# Patient Record
Sex: FEMALE | Race: WHITE | URBAN - NONMETROPOLITAN AREA
[De-identification: names, ages, dates, MRNs, and addresses within clinical notes are randomized per-mention and may not be internally consistent; named-entity substitution may affect disease eponyms.]

---

## 2017-01-23 ENCOUNTER — TELEPHONE (OUTPATIENT)
Dept: INTERNAL MEDICINE | Facility: OTHER | Age: 52
End: 2017-01-23

## 2017-01-23 NOTE — TELEPHONE ENCOUNTER
3:05 PM    Reason for Call: Phone Call    Description: Pt called and stated she has appt on Feb 3rd for [facility]/she just found out she has to have LT knee surgery on Feb 24th and needs a preop/she would like to do both of these appts at the same time/advised pt usually you would have to have 2 separate appts for these 2 things but she does not want to come in twice/please call her back at [phone]    Was an appointment offered for this call? Yes    Preferred method for responding to this message: Telephone Call    If we cannot reach you directly, may we leave a detailed response at the number you provided? Yes    Can this message wait until your PCP/provider returns, if available today? Not applicable    Kate Marsh

## 2017-01-23 NOTE — TELEPHONE ENCOUNTER
"Called pt back and told her it is not possible to set up an establish care and a pre-op together- she states she is upset and will get annoyed when she comes back and is asked the same questions twice- she is feeling \"cheated and like you just want to take my money\" told patient I was sorry and that pre-ops and establish care appointments are different and we do different things in those appointments - in addition to us not knowing having her medical records. Pt states she does not have medical records because she does not see a doctor on a regular basis- patient states she has not seen one in about 10 years and can not tell me who she saw or the clinic she went to. Pt states she is going to call her insurance company. Asked to be transferred to schedule a pre-op appointment. Pt was transferred to scheduling. Lani Dickinson LPN    "